# Patient Record
Sex: MALE | Race: WHITE | NOT HISPANIC OR LATINO | Employment: FULL TIME | URBAN - METROPOLITAN AREA
[De-identification: names, ages, dates, MRNs, and addresses within clinical notes are randomized per-mention and may not be internally consistent; named-entity substitution may affect disease eponyms.]

---

## 2023-04-04 ENCOUNTER — OFFICE VISIT (OUTPATIENT)
Dept: PRIMARY CARE | Facility: CLINIC | Age: 63
End: 2023-04-04
Payer: COMMERCIAL

## 2023-04-04 VITALS
HEART RATE: 58 BPM | WEIGHT: 180 LBS | SYSTOLIC BLOOD PRESSURE: 104 MMHG | OXYGEN SATURATION: 98 % | TEMPERATURE: 97.7 F | DIASTOLIC BLOOD PRESSURE: 66 MMHG | BODY MASS INDEX: 28.25 KG/M2 | HEIGHT: 67 IN

## 2023-04-04 DIAGNOSIS — D50.9 IRON DEFICIENCY ANEMIA, UNSPECIFIED IRON DEFICIENCY ANEMIA TYPE: ICD-10-CM

## 2023-04-04 DIAGNOSIS — Z00.00 HEALTH MAINTENANCE EXAMINATION: Primary | ICD-10-CM

## 2023-04-04 PROBLEM — G47.19 EXCESSIVE DAYTIME SLEEPINESS: Status: ACTIVE | Noted: 2023-04-04

## 2023-04-04 PROBLEM — G47.33 OSA (OBSTRUCTIVE SLEEP APNEA): Status: ACTIVE | Noted: 2023-04-04

## 2023-04-04 PROBLEM — M54.2 NECK PAIN: Status: ACTIVE | Noted: 2023-04-04

## 2023-04-04 PROBLEM — R06.83 SNORING: Status: ACTIVE | Noted: 2023-04-04

## 2023-04-04 PROBLEM — D64.9 ANEMIA: Status: ACTIVE | Noted: 2023-04-04

## 2023-04-04 PROBLEM — F07.81 POST-CONCUSSION SYNDROME: Status: ACTIVE | Noted: 2023-04-04

## 2023-04-04 PROBLEM — M21.41 BILATERAL PES PLANUS: Status: ACTIVE | Noted: 2023-04-04

## 2023-04-04 PROBLEM — R53.83 FATIGUE: Status: ACTIVE | Noted: 2023-04-04

## 2023-04-04 PROBLEM — J31.0 RHINITIS: Status: ACTIVE | Noted: 2023-04-04

## 2023-04-04 PROBLEM — M21.42 BILATERAL PES PLANUS: Status: ACTIVE | Noted: 2023-04-04

## 2023-04-04 PROCEDURE — 99396 PREV VISIT EST AGE 40-64: CPT | Performed by: STUDENT IN AN ORGANIZED HEALTH CARE EDUCATION/TRAINING PROGRAM

## 2023-04-04 PROCEDURE — 1036F TOBACCO NON-USER: CPT | Performed by: STUDENT IN AN ORGANIZED HEALTH CARE EDUCATION/TRAINING PROGRAM

## 2023-04-04 RX ORDER — TIZANIDINE 2 MG/1
1-2 TABLET ORAL EVERY 6 HOURS PRN
COMMUNITY
Start: 2021-12-29

## 2023-04-04 ASSESSMENT — PATIENT HEALTH QUESTIONNAIRE - PHQ9
1. LITTLE INTEREST OR PLEASURE IN DOING THINGS: NOT AT ALL
SUM OF ALL RESPONSES TO PHQ9 QUESTIONS 1 AND 2: 0
2. FEELING DOWN, DEPRESSED OR HOPELESS: NOT AT ALL

## 2023-04-04 ASSESSMENT — ENCOUNTER SYMPTOMS
DIARRHEA: 0
FEVER: 0
CONSTIPATION: 0
DYSPHORIC MOOD: 0
NECK STIFFNESS: 1
FATIGUE: 0
WHEEZING: 0
SHORTNESS OF BREATH: 0
DYSURIA: 0
BACK PAIN: 1
NERVOUS/ANXIOUS: 0
HEADACHES: 0
PALPITATIONS: 0
HEMATURIA: 0
CHILLS: 0
COUGH: 0
ABDOMINAL PAIN: 0
NECK PAIN: 1
FREQUENCY: 0
NUMBNESS: 0
NAUSEA: 0
DIZZINESS: 0

## 2023-04-04 NOTE — PROGRESS NOTES
"Abrahan Richardson  presents for his annual wellness exam.    Specialists seen by patient: eye doctor  -dentist    Hx of colon ca screening: yes, thinks he is due next year  Hx of prostate cancer screening (men 55-69): yes  History of depression or anxiety:no  Immunizations: not up to date - tdap needs updated, will do at pharmacy  Diet: Follows a healthy diet, well rounded  Exercise: Gets regular exercise, walks 2 miles with dog daily after work.   Alcohol abuse screen:   none   How many times in the past year 5 or more drinks in a day?  Smoking history: never a smoker  Drug use: n/a    Was anemic last year. Takes iron daily. Had low iron as well.    Review of Systems   Constitutional:  Negative for chills, fatigue and fever.   Respiratory:  Negative for cough, shortness of breath and wheezing.    Cardiovascular:  Negative for chest pain, palpitations and leg swelling.   Gastrointestinal:  Negative for abdominal pain, constipation, diarrhea and nausea.   Genitourinary:  Negative for dysuria, frequency, hematuria and urgency.   Musculoskeletal:  Positive for back pain, neck pain and neck stiffness.   Neurological:  Negative for dizziness, numbness and headaches.   Psychiatric/Behavioral:  Negative for dysphoric mood. The patient is not nervous/anxious.           Objective    /66 (BP Location: Left arm, Patient Position: Sitting, BP Cuff Size: Adult)   Pulse 58   Temp 36.5 °C (97.7 °F) (Temporal)   Ht 1.702 m (5' 7\")   Wt 81.6 kg (180 lb)   SpO2 98%   BMI 28.19 kg/m²     Physical Exam  Constitutional:       General: He is not in acute distress.     Appearance: Normal appearance.   HENT:      Head: Normocephalic and atraumatic.      Right Ear: Tympanic membrane and ear canal normal.      Left Ear: Tympanic membrane and ear canal normal.      Ears:      Comments: Eczema/scaling bilateral external ears     Mouth/Throat:      Mouth: Mucous membranes are moist.      Pharynx: No posterior oropharyngeal erythema. "   Eyes:      Extraocular Movements: Extraocular movements intact.      Pupils: Pupils are equal, round, and reactive to light.   Cardiovascular:      Rate and Rhythm: Normal rate and regular rhythm.      Heart sounds: No murmur heard.  Pulmonary:      Effort: Pulmonary effort is normal. No respiratory distress.      Breath sounds: Normal breath sounds. No wheezing.   Abdominal:      General: Bowel sounds are normal.      Palpations: Abdomen is soft.      Tenderness: There is no abdominal tenderness. There is no guarding.   Musculoskeletal:      Cervical back: Neck supple.      Comments: Normal gait and range of motion   Skin:     General: Skin is warm and dry.   Neurological:      General: No focal deficit present.      Mental Status: He is alert and oriented to person, place, and time.   Psychiatric:         Mood and Affect: Mood normal.         Behavior: Behavior normal.            Problem List Items Addressed This Visit    None  Visit Diagnoses       Health maintenance examination    -  Primary    Relevant Orders    Prostate Specific Antigen    CBC    Lipid Panel    Basic Metabolic Panel    Follow Up In Primary Care             We will obtain fasting blood work.  Results will be communicated to the patient via MyChart or a letter.   We reviewed appropriate preventative health screening guidelines. The patient is not up-to-date on vaccinations.  The patient had a colonoscopy in ~9 years ago.  They advised repeat colonoscopy in 3024.  We discussed regular aerobic exercise. We discussed proper nutrition and weight control.    Tori Ro,   04/04/23

## 2023-04-04 NOTE — PATIENT INSTRUCTIONS
Recommendations for men annual wellness exam:   Colonoscopy at age 45 or earlier if positive family history of colon cancer   Discuss prostate cancer screening between ages 55-69 or sooner if family history of prostate cancer   One time screen for abdominal aortic aneurysm for men ages 65-75 who have ever smoked  Screening for lung cancer with low-dose CT in all adults age 55-77 who have a 30 pack-year smoking history and currently smoke or have quit within the past 15 years  Follow a healthy diet (Dash diet, Mediterranean diet)  Exercise 150 min/wk  Maintain healthy weight (BMI < 25)  Do not smoke   Alcohol in moderation (up to 2 drinks/day)   Get enough sleep (7-8 hours/night)  Make sure immunizations are up to date (influenza, pneumococcal, Tdap, shingles if age > 50)  Visit dentist twice yearly

## 2023-04-05 ENCOUNTER — LAB (OUTPATIENT)
Dept: LAB | Facility: LAB | Age: 63
End: 2023-04-05
Payer: COMMERCIAL

## 2023-04-05 DIAGNOSIS — Z00.00 HEALTH MAINTENANCE EXAMINATION: ICD-10-CM

## 2023-04-05 DIAGNOSIS — D50.9 IRON DEFICIENCY ANEMIA, UNSPECIFIED IRON DEFICIENCY ANEMIA TYPE: ICD-10-CM

## 2023-04-05 LAB
ANION GAP IN SER/PLAS: 8 MMOL/L (ref 10–20)
CALCIUM (MG/DL) IN SER/PLAS: 8.1 MG/DL (ref 8.6–10.3)
CARBON DIOXIDE, TOTAL (MMOL/L) IN SER/PLAS: 28 MMOL/L (ref 21–32)
CHLORIDE (MMOL/L) IN SER/PLAS: 108 MMOL/L (ref 98–107)
CHOLESTEROL (MG/DL) IN SER/PLAS: 145 MG/DL (ref 0–199)
CHOLESTEROL IN HDL (MG/DL) IN SER/PLAS: 41.1 MG/DL
CHOLESTEROL/HDL RATIO: 3.5
CREATININE (MG/DL) IN SER/PLAS: 0.74 MG/DL (ref 0.5–1.3)
ERYTHROCYTE DISTRIBUTION WIDTH (RATIO) BY AUTOMATED COUNT: 14.1 % (ref 11.5–14.5)
ERYTHROCYTE MEAN CORPUSCULAR HEMOGLOBIN CONCENTRATION (G/DL) BY AUTOMATED: 32 G/DL (ref 32–36)
ERYTHROCYTE MEAN CORPUSCULAR VOLUME (FL) BY AUTOMATED COUNT: 90 FL (ref 80–100)
ERYTHROCYTES (10*6/UL) IN BLOOD BY AUTOMATED COUNT: 4.86 X10E12/L (ref 4.5–5.9)
FERRITIN (UG/LL) IN SER/PLAS: 33 UG/L (ref 20–300)
GFR MALE: >90 ML/MIN/1.73M2
GLUCOSE (MG/DL) IN SER/PLAS: 87 MG/DL (ref 74–99)
HEMATOCRIT (%) IN BLOOD BY AUTOMATED COUNT: 43.7 % (ref 41–52)
HEMOGLOBIN (G/DL) IN BLOOD: 14 G/DL (ref 13.5–17.5)
IRON (UG/DL) IN SER/PLAS: 70 UG/DL (ref 35–150)
IRON BINDING CAPACITY (UG/DL) IN SER/PLAS: 343 UG/DL (ref 240–445)
IRON SATURATION (%) IN SER/PLAS: 20 % (ref 25–45)
LDL: 76 MG/DL (ref 0–99)
LEUKOCYTES (10*3/UL) IN BLOOD BY AUTOMATED COUNT: 5.8 X10E9/L (ref 4.4–11.3)
PLATELETS (10*3/UL) IN BLOOD AUTOMATED COUNT: 263 X10E9/L (ref 150–450)
POTASSIUM (MMOL/L) IN SER/PLAS: 5 MMOL/L (ref 3.5–5.3)
PROSTATE SPECIFIC AG (NG/ML) IN SER/PLAS: 0.85 NG/ML (ref 0–4)
SODIUM (MMOL/L) IN SER/PLAS: 139 MMOL/L (ref 136–145)
TRIGLYCERIDE (MG/DL) IN SER/PLAS: 139 MG/DL (ref 0–149)
UREA NITROGEN (MG/DL) IN SER/PLAS: 14 MG/DL (ref 6–23)
VLDL: 28 MG/DL (ref 0–40)

## 2023-04-05 PROCEDURE — 82728 ASSAY OF FERRITIN: CPT

## 2023-04-05 PROCEDURE — 85027 COMPLETE CBC AUTOMATED: CPT

## 2023-04-05 PROCEDURE — 36415 COLL VENOUS BLD VENIPUNCTURE: CPT

## 2023-04-05 PROCEDURE — 80061 LIPID PANEL: CPT

## 2023-04-05 PROCEDURE — 83540 ASSAY OF IRON: CPT

## 2023-04-05 PROCEDURE — 80048 BASIC METABOLIC PNL TOTAL CA: CPT

## 2023-04-05 PROCEDURE — 83550 IRON BINDING TEST: CPT

## 2023-04-05 PROCEDURE — 84153 ASSAY OF PSA TOTAL: CPT

## 2023-04-20 ENCOUNTER — TELEPHONE (OUTPATIENT)
Dept: PRIMARY CARE | Facility: CLINIC | Age: 63
End: 2023-04-20
Payer: COMMERCIAL

## 2024-01-05 ENCOUNTER — TELEPHONE (OUTPATIENT)
Dept: PRIMARY CARE | Facility: CLINIC | Age: 64
End: 2024-01-05
Payer: COMMERCIAL

## 2024-01-08 DIAGNOSIS — Z12.11 SCREENING FOR COLON CANCER: ICD-10-CM

## 2024-01-08 NOTE — TELEPHONE ENCOUNTER
Per Dr. Ro placed order.   Called and spoke to Dafne (wife on HIPAA) and told her the order is in and should be able to schedule. Wanted to schedule on line gave her Colonoscopy scheduling number in case that was unsuccessful.

## 2024-01-10 ENCOUNTER — TELEPHONE (OUTPATIENT)
Dept: GASTROENTEROLOGY | Facility: CLINIC | Age: 64
End: 2024-01-10
Payer: COMMERCIAL

## 2024-01-10 NOTE — TELEPHONE ENCOUNTER
----- Message from Brittaney Snider MA sent at 1/10/2024  9:55 AM EST -----  Regarding: RE: Colonoscopy screening  SCHEDULED FOR 2.19.24 WITH MARY  PATIENT STATED HE ONLY GETS CERTAIN DAYS OFF WORK AND COULD ONLY DO FAISAL DIO DAI DAY OR PRESIDENTS DAY. PREFERRED PRESIDENTS DAY.  ----- Message -----  From: Madeline Jacques RN  Sent: 1/9/2024   2:03 PM EST  To: [facility]  Subject: Colonoscopy screening                            Office visit. Per wife pt is having rectal bleeding.

## 2024-02-19 ENCOUNTER — APPOINTMENT (OUTPATIENT)
Dept: GASTROENTEROLOGY | Facility: CLINIC | Age: 64
End: 2024-02-19
Payer: COMMERCIAL

## 2024-03-06 NOTE — TELEPHONE ENCOUNTER
Patient wants a referral to a dermatologist for eczema   Detail Level: Simple Additional Notes: Patient consent was obtained to proceed with the visit and recommended plan of care after discussion of all risks and benefits, including the risks of COVID-19 exposure.

## 2024-03-27 ENCOUNTER — APPOINTMENT (OUTPATIENT)
Dept: PRIMARY CARE | Facility: CLINIC | Age: 64
End: 2024-03-27
Payer: COMMERCIAL

## 2024-04-01 ENCOUNTER — APPOINTMENT (OUTPATIENT)
Dept: GASTROENTEROLOGY | Facility: CLINIC | Age: 64
End: 2024-04-01
Payer: COMMERCIAL

## 2024-04-01 ENCOUNTER — TELEPHONE (OUTPATIENT)
Dept: PRIMARY CARE | Facility: CLINIC | Age: 64
End: 2024-04-01

## 2024-04-01 NOTE — TELEPHONE ENCOUNTER
Patient's wife called and wanted to know whether to schedule an appointment before or after his colonoscopy he is planning to have done in Sept.

## 2024-04-02 NOTE — TELEPHONE ENCOUNTER
Called and spoke to Dafne. Let them know that it is up to pt when to schedule. If he wants to talk in depth about the colonoscopy then after, but did remind that patient is due for a physical or follow up because it has been a year since seen.   Verbalized understanding.

## 2024-04-04 ENCOUNTER — APPOINTMENT (OUTPATIENT)
Dept: PRIMARY CARE | Facility: CLINIC | Age: 64
End: 2024-04-04
Payer: COMMERCIAL

## 2024-09-09 ENCOUNTER — APPOINTMENT (OUTPATIENT)
Dept: GASTROENTEROLOGY | Facility: CLINIC | Age: 64
End: 2024-09-09
Payer: COMMERCIAL

## 2024-09-09 VITALS
DIASTOLIC BLOOD PRESSURE: 68 MMHG | HEIGHT: 68 IN | SYSTOLIC BLOOD PRESSURE: 106 MMHG | HEART RATE: 68 BPM | OXYGEN SATURATION: 96 % | WEIGHT: 195 LBS | BODY MASS INDEX: 29.55 KG/M2

## 2024-09-09 DIAGNOSIS — R13.19 ESOPHAGEAL DYSPHAGIA: Primary | ICD-10-CM

## 2024-09-09 DIAGNOSIS — Z12.11 COLON CANCER SCREENING: ICD-10-CM

## 2024-09-09 PROCEDURE — 1036F TOBACCO NON-USER: CPT | Performed by: PHYSICIAN ASSISTANT

## 2024-09-09 PROCEDURE — 99204 OFFICE O/P NEW MOD 45 MIN: CPT | Performed by: PHYSICIAN ASSISTANT

## 2024-09-09 PROCEDURE — 3008F BODY MASS INDEX DOCD: CPT | Performed by: PHYSICIAN ASSISTANT

## 2024-09-09 RX ORDER — POLYETHYLENE GLYCOL 3350, SODIUM SULFATE ANHYDROUS, SODIUM BICARBONATE, SODIUM CHLORIDE, POTASSIUM CHLORIDE 236; 22.74; 6.74; 5.86; 2.97 G/4L; G/4L; G/4L; G/4L; G/4L
4 POWDER, FOR SOLUTION ORAL ONCE
Qty: 4000 ML | Refills: 0 | Status: SHIPPED | OUTPATIENT
Start: 2024-09-09 | End: 2024-09-09

## 2024-09-09 NOTE — PATIENT INSTRUCTIONS
Thank you for coming in for your appointment.    -EGD and colonoscopy ordered for screening as well as evaluation of the trouble swallowing.  -Call with questions or concerns  -Bowel prep sent to your pharmacy

## 2024-09-09 NOTE — PROGRESS NOTES
"Subjective   Patient ID: Abrahan Richardson is a 63 y.o. male who was referred by his PCP for Colon Cancer Screening (Oa fail rectal bleed - per pt he has hemorrhoids /Colon 2013).    Patient's PCP is Dr. Ro    PMH: HARMONY    HPI  Patient states that he is here to discuss updating colonoscopy. His last colonoscopy was in 2013 and normal. He states that his only symptom is dysphagia. He states that he has had some chornic dysphagia. He states that when he is swallowing solids, they \"go down slowly\". He states that they get stuck in the center of his upper chest. He states that when the solids get stuck, he has to wash them down with liquids. No dysphagia to liquids. He states that he has chronic GERD, managed with daily OTC acid reflux medication. Denies unexplained weight loss, N/V, abdominal pain, change in bowel habits, melena. He states that he has hemorrhoids and has noticed small amount of red blood with wiping intermittently for many years.     Social History:  NSAIDs: Baby aspirin daily  Tobacco: Denies   Alcohol: Denies  Drug use: Denies     Family History: Denies any known family history of GI malignancy.    Prior GI evaluation:  Colonoscopy 2013: hemorrhoids seen    No EGD in the past.    Review of Systems:  Constitutional: No reported fever, chills, or weight loss.  Skin: No reported icterus, lesions, or rash.  Eye: No reported itching, pain, vision changes.  Ear: No reported discharge, hearing loss, or pain.  Nose: No reported congestion, discharge, or epistaxis.  Mouth/throat: +dysphagia  Resp: No reported cough, dyspnea, or wheezing.  Cardiovascular: No reported chest pain, lower extremity edema, or palpitations.   GI: Reports many years of bright red blood with wiping  : No reported dysuria, hematuria, or frequency.  Neuro: No reported confusion, memory loss, headaches, or dizziness.  Psych: No reported anxiety, depression, or insomnia.  Musculoskeletal: No reported arthralgia, joint swelling, or " myalgias.  Heme/lymph: No reported easy bleeding or bruising, or swollen lymph nodes.  Endocrine: No reported cold/heat intolerance, polydipsia, or polyuria.     Medications:  Prior to Admission medications    Medication Sig Start Date End Date Taking? Authorizing Provider   tiZANidine (Zanaflex) 2 mg tablet Take 1-2 tablets (2-4 mg) by mouth every 6 hours if needed. spasm 12/29/21 9/9/24  Historical Provider, MD       Allergies:  Patient has no known allergies.    Objective   Physical exam:  Constitutional: Well developed, well nourished 63 y.o. year old in no acute distress.   Skin: Warm and dry. Normal turgor. No rash, ulcer, trauma, jaundice.   Eyes: Pupils symmetric and reactive to light.  Respiratory: Clear to auscultation bilaterally. No wheezes, rhonchi, or rales heard.  Cardiovascular: Regular rate and rhythm. S1 and S2 appreciated and normal. No murmur, rub, or gallop heard.   Edema: No edema noted.  GI: Normal bowel sounds. Soft, non-distended, non-tender. No rebound or guarding present. No hepatomegaly or splenomegaly appreciated. Abdominal aorta not palpably abnormal.  Musculoskeletal: Limbs and Joints grossly normal. Full range of motion in major joint.   Neuro: Alert and oriented x 3. Cranial nerves 2-12 grossly intact.   Psych: Normal mood and affect.        Relevant Results Recent labs reviewed in the EMR.    Radiology: Reviewed imaging reviewed in the EMR.  No results found.    Assessment/Plan   Problem List Items Addressed This Visit             ICD-10-CM    Esophageal dysphagia - Primary R13.19     Patient with years of dysphagia in the setting of chronic GERD. He takes OTC acid reflux medications. Due to this and dysphagia, EGD recommended to rule out esophagitis, EoE, esophageal stricture. Mass or malignancy possible but less likely.         Relevant Orders    Esophagogastroduodenoscopy (EGD)    Colon cancer screening Z12.11     Patient's last colonoscopy was in 2013. No significant lower  symptoms at this time. Colonoscopy ordered for further evaluation. Golytely prescribed.         Relevant Medications    polyethylene glycol (Golytely) 236-22.74-6.74 -5.86 gram solution    Other Relevant Orders    Colonoscopy Screening; High Risk Patient       Meds to hold:  Michelle Kinsey PA-C

## 2024-09-09 NOTE — LETTER
"September 9, 2024     Tori Ro,   [address]  [facility]  [facility] [address]    Patient: DB Richardson   YOB: 1960   Date of Visit: 9/9/2024       Dear Dr. Tori Ro, :    Thank you for referring DB Richardson to me for evaluation. Below are my notes for this consultation.  If you have questions, please do not hesitate to call me. I look forward to following your patient along with you.       Sincerely,     Michelle Kinsey PA-C      CC: No Recipients  ______________________________________________________________________________________    Subjective   Patient ID: Db Richardson is a 63 y.o. male who was referred by his PCP for Colon Cancer Screening (Oa fail rectal bleed - per pt he has hemorrhoids /Colon 2013).    Patient's PCP is Dr. Ro    PMH: HARMONY    HPI  Patient states that he is here to discuss updating colonoscopy. His last colonoscopy was in 2013 and normal. He states that his only symptom is dysphagia. He states that he has had some chornic dysphagia. He states that when he is swallowing solids, they \"go down slowly\". He states that they get stuck in the center of his upper chest. He states that when the solids get stuck, he has to wash them down with liquids. No dysphagia to liquids. He states that he has chronic GERD, managed with daily OTC acid reflux medication. Denies unexplained weight loss, N/V, abdominal pain, change in bowel habits, melena. He states that he has hemorrhoids and has noticed small amount of red blood with wiping intermittently for many years.     Social History:  NSAIDs: Baby aspirin daily  Tobacco: Denies   Alcohol: Denies  Drug use: Denies     Family History: Denies any known family history of GI malignancy.    Prior GI evaluation:  Colonoscopy 2013: hemorrhoids seen    No EGD in the past.    Review of Systems:  Constitutional: No reported fever, chills, or weight loss.  Skin: No reported icterus, lesions, or " rash.  Eye: No reported itching, pain, vision changes.  Ear: No reported discharge, hearing loss, or pain.  Nose: No reported congestion, discharge, or epistaxis.  Mouth/throat: +dysphagia  Resp: No reported cough, dyspnea, or wheezing.  Cardiovascular: No reported chest pain, lower extremity edema, or palpitations.   GI: Reports many years of bright red blood with wiping  : No reported dysuria, hematuria, or frequency.  Neuro: No reported confusion, memory loss, headaches, or dizziness.  Psych: No reported anxiety, depression, or insomnia.  Musculoskeletal: No reported arthralgia, joint swelling, or myalgias.  Heme/lymph: No reported easy bleeding or bruising, or swollen lymph nodes.  Endocrine: No reported cold/heat intolerance, polydipsia, or polyuria.     Medications:  Prior to Admission medications    Medication Sig Start Date End Date Taking? Authorizing Provider   tiZANidine (Zanaflex) 2 mg tablet Take 1-2 tablets (2-4 mg) by mouth every 6 hours if needed. spasm 12/29/21 9/9/24  Historical Provider, MD       Allergies:  Patient has no known allergies.    Objective   Physical exam:  Constitutional: Well developed, well nourished 63 y.o. year old in no acute distress.   Skin: Warm and dry. Normal turgor. No rash, ulcer, trauma, jaundice.   Eyes: Pupils symmetric and reactive to light.  Respiratory: Clear to auscultation bilaterally. No wheezes, rhonchi, or rales heard.  Cardiovascular: Regular rate and rhythm. S1 and S2 appreciated and normal. No murmur, rub, or gallop heard.   Edema: No edema noted.  GI: Normal bowel sounds. Soft, non-distended, non-tender. No rebound or guarding present. No hepatomegaly or splenomegaly appreciated. Abdominal aorta not palpably abnormal.  Musculoskeletal: Limbs and Joints grossly normal. Full range of motion in major joint.   Neuro: Alert and oriented x 3. Cranial nerves 2-12 grossly intact.   Psych: Normal mood and affect.        Relevant Results Recent labs reviewed in the  EMR.    Radiology: Reviewed imaging reviewed in the EMR.  No results found.    Assessment/Plan   Problem List Items Addressed This Visit             ICD-10-CM    Esophageal dysphagia - Primary R13.19     Patient with years of dysphagia in the setting of chronic GERD. He takes OTC acid reflux medications. Due to this and dysphagia, EGD recommended to rule out esophagitis, EoE, esophageal stricture. Mass or malignancy possible but less likely.         Relevant Orders    Esophagogastroduodenoscopy (EGD)    Colon cancer screening Z12.11     Patient's last colonoscopy was in 2013. No significant lower symptoms at this time. Colonoscopy ordered for further evaluation. Golytely prescribed.         Relevant Medications    polyethylene glycol (Golytely) 236-22.74-6.74 -5.86 gram solution    Other Relevant Orders    Colonoscopy Screening; High Risk Patient       Meds to hold:  Michelle Kinsey PA-C

## 2024-09-09 NOTE — ASSESSMENT & PLAN NOTE
Patient's last colonoscopy was in 2013. No significant lower symptoms at this time. Colonoscopy ordered for further evaluation. Golytely prescribed.

## 2024-09-09 NOTE — ASSESSMENT & PLAN NOTE
Patient with years of dysphagia in the setting of chronic GERD. He takes OTC acid reflux medications. Due to this and dysphagia, EGD recommended to rule out esophagitis, EoE, esophageal stricture. Mass or malignancy possible but less likely.

## 2024-09-19 ENCOUNTER — APPOINTMENT (OUTPATIENT)
Dept: DERMATOLOGY | Facility: CLINIC | Age: 64
End: 2024-09-19
Payer: COMMERCIAL

## 2024-09-20 ENCOUNTER — HOSPITAL ENCOUNTER (OUTPATIENT)
Dept: RADIOLOGY | Facility: CLINIC | Age: 64
Discharge: HOME | End: 2024-09-20
Payer: COMMERCIAL

## 2024-09-20 ENCOUNTER — OFFICE VISIT (OUTPATIENT)
Dept: ORTHOPEDIC SURGERY | Facility: CLINIC | Age: 64
End: 2024-09-20
Payer: COMMERCIAL

## 2024-09-20 VITALS — BODY MASS INDEX: 29.55 KG/M2 | HEIGHT: 68 IN | WEIGHT: 195 LBS

## 2024-09-20 DIAGNOSIS — M25.512 CHRONIC LEFT SHOULDER PAIN: ICD-10-CM

## 2024-09-20 DIAGNOSIS — G89.29 CHRONIC LEFT SHOULDER PAIN: ICD-10-CM

## 2024-09-20 DIAGNOSIS — M67.912 DISORDER OF LEFT ROTATOR CUFF: Primary | ICD-10-CM

## 2024-09-20 DIAGNOSIS — M19.019 GLENOHUMERAL ARTHRITIS: ICD-10-CM

## 2024-09-20 PROCEDURE — 1036F TOBACCO NON-USER: CPT | Performed by: STUDENT IN AN ORGANIZED HEALTH CARE EDUCATION/TRAINING PROGRAM

## 2024-09-20 PROCEDURE — 73030 X-RAY EXAM OF SHOULDER: CPT | Mod: LT

## 2024-09-20 PROCEDURE — 99204 OFFICE O/P NEW MOD 45 MIN: CPT | Performed by: STUDENT IN AN ORGANIZED HEALTH CARE EDUCATION/TRAINING PROGRAM

## 2024-09-20 PROCEDURE — 3008F BODY MASS INDEX DOCD: CPT | Performed by: STUDENT IN AN ORGANIZED HEALTH CARE EDUCATION/TRAINING PROGRAM

## 2024-09-20 PROCEDURE — 20610 DRAIN/INJ JOINT/BURSA W/O US: CPT | Performed by: STUDENT IN AN ORGANIZED HEALTH CARE EDUCATION/TRAINING PROGRAM

## 2024-09-20 RX ORDER — LIDOCAINE HYDROCHLORIDE 10 MG/ML
5 INJECTION, SOLUTION INFILTRATION; PERINEURAL
Status: COMPLETED | OUTPATIENT
Start: 2024-09-20 | End: 2024-09-20

## 2024-09-20 RX ORDER — TRIAMCINOLONE ACETONIDE 40 MG/ML
80 INJECTION, SUSPENSION INTRA-ARTICULAR; INTRAMUSCULAR
Status: COMPLETED | OUTPATIENT
Start: 2024-09-20 | End: 2024-09-20

## 2024-09-20 ASSESSMENT — PAIN - FUNCTIONAL ASSESSMENT: PAIN_FUNCTIONAL_ASSESSMENT: 0-10

## 2024-09-20 ASSESSMENT — PAIN SCALES - GENERAL: PAINLEVEL_OUTOF10: 8

## 2024-09-20 NOTE — PROGRESS NOTES
CHIEF COMPLAINT:   Chief Complaint   Patient presents with    Left Shoulder - Pain       History: 63 y.o. male presents to the office today for left shoulder pain. He has had localized pain and clicking/popping that is anterior and deep for that past 5 years. He has trouble with overhead activities. His mother had DJD. Reports of numbness/tingling in the hand. He has neck pain and is seeing a doctor for it. No Hx of trauma, Sx, or injections. No diabetes. Advil and an lidocaine cream as needed. XR done today.  He has not had any specific treatments of the left shoulder.  He does work as a .  He is planning on retiring sometime in January.  Pain wakes him up at night and is persistent in the daytime.  No specific injury he can remember.    Past medical history, past surgical history, medications, allergies, family history, social history, and review of systems were reviewed today.    A 12 point review of systems was negative other than as stated in the HPI.    Past Medical History:   Diagnosis Date    Personal history of other specified conditions 07/21/2019    History of fatigue        No Known Allergies     Past Surgical History:   Procedure Laterality Date    OTHER SURGICAL HISTORY  11/15/2013    Abdominal Surgery        Family History   Problem Relation Name Age of Onset    Dementia Mother      Hypertension Mother      Hypertension Father          Social History     Socioeconomic History    Marital status:      Spouse name: Not on file    Number of children: Not on file    Years of education: Not on file    Highest education level: Not on file   Occupational History    Not on file   Tobacco Use    Smoking status: Never    Smokeless tobacco: Never   Vaping Use    Vaping status: Never Used   Substance and Sexual Activity    Alcohol use: Never    Drug use: Never    Sexual activity: Not on file   Other Topics Concern    Not on file   Social History Narrative    Not on file     Social  "Determinants of Health     Financial Resource Strain: Not on file   Food Insecurity: Not on file   Transportation Needs: Not on file   Physical Activity: Not on file   Stress: Not on file   Social Connections: Not on file   Intimate Partner Violence: Not on file   Housing Stability: Not on file        CURRENT MEDICATIONS:   No current outpatient medications on file.     No current facility-administered medications for this visit.       Physical Examination:      11/15/2021     3:39 PM 12/27/2021    10:03 AM 12/29/2021    10:50 AM 4/4/2023     3:02 PM 9/9/2024    11:23 AM   Vitals   Systolic 131 118 126 104 106   Diastolic 86 64 82 66 68   Heart Rate 74 106 74 58 68   Temp 36.2 °C (97.1 °F) 36.3 °C (97.4 °F) 35.9 °C (96.6 °F) 36.5 °C (97.7 °F)    Resp 16       Height (in)  1.702 m (5' 7\")  1.702 m (5' 7\") 1.727 m (5' 8\")   Weight (lb)  189 189 180 195   BMI  29.6 kg/m2 29.6 kg/m2 28.19 kg/m2 29.65 kg/m2   BSA (m2)  2.01 m2 2.01 m2 1.96 m2 2.06 m2   Visit Report    Report Report      There is no height or weight on file to calculate BMI.    Well-appearing, appears stated age, pleasant and cooperative, appropriate mood and behavior. Height and weight reviewed. Alert and oriented x3.  Auditory function intact.  No acute distress.  Intact ocular function, STEPHANIE, EOMI. Breathing is unlabored .  There is no evidence of jugular venous distension. Skin appearance is normal without evidence of rash or other lesions. 2+ radial pulses bilaterally, fingers pink and wwp, good capillary refill, no pitting edema. No appreciable lymphadenopathy in bilateral upper extremities. SILT throughout both upper extremities, median/radial/ulnar/musculocutaneous/axillary nerve motor and sensory intact (except for abnormalities noted in focused musculoskeletal exam section below).     Neck exam: Full range of motion of the neck in flexion/extension and rotational movements. No significant areas of tenderness to palpation in the neck.    On exam " of bilateral upper extremities, he does have symmetric range of motion of both shoulders, but is weak on the left side.  Active forward flexion to 160, external rotation to 40, internal Tatian to T12.  Painful arc of motion on the left.  Rotator cuff strength is mildly diminished on the left, 4+ out of 5 supraspinatus strength and external rotation strength.  5 out of 5 on the right.  Pain with Neer and Emanuel maneuvers of the left shoulder.    Imaging: Radiographs of the left shoulder performed today.  Personally interpreted by myself.  Preserved glenohumeral joint space.  Preserved acromiohumeral interval.  No acute fractures noted.       Assessment: Left rotator cuff disease    Plan: Patient's symptoms, test result and exam are consistent with a diagnosis of rotator cuff disease.  We did discuss the natural history of this.  Conservative management is often the first-line treatment for this, which includes physical therapy, injections and activity modification.  In rare cases of recalcitrant pain, surgery may be indicated, but this is only after extensive nonoperative care.  Patient understands this.  They wish to proceed with injeciton and PT. we will see him back in 3 months to reevaluate.  If is not better we will get an MRI at that point.    Injection was performed, please see separate procedure note, patient tolerated well.     Patient ID: Abrahan Richardson is a 63 y.o. male.    L Inj/Asp: L subacromial bursa on 9/20/2024 11:06 AM  Indications: pain  Details: 22 G needle, posterior approach  Medications: 80 mg triamcinolone acetonide 40 mg/mL; 5 mL lidocaine 10 mg/mL (1 %)  Outcome: tolerated well, no immediate complications  Procedure, treatment alternatives, risks and benefits explained, specific risks discussed. Consent was given by the patient. Immediately prior to procedure a time out was called to verify the correct patient, procedure, equipment, support staff and site/side marked as required. Patient was  prepped and draped in the usual sterile fashion.             Dragon software was used to dictate this note, please be aware that minor errors in transcription may be present.    Jesus Rodriguez MD    Shoulder/Elbow Surgery  [facility]

## 2024-10-11 ENCOUNTER — PREP FOR PROCEDURE (OUTPATIENT)
Dept: GASTROENTEROLOGY | Facility: CLINIC | Age: 64
End: 2024-10-11
Payer: COMMERCIAL

## 2024-10-14 ENCOUNTER — ANESTHESIA (OUTPATIENT)
Dept: GASTROENTEROLOGY | Facility: HOSPITAL | Age: 64
End: 2024-10-14
Payer: COMMERCIAL

## 2024-10-14 ENCOUNTER — ANESTHESIA EVENT (OUTPATIENT)
Dept: GASTROENTEROLOGY | Facility: HOSPITAL | Age: 64
End: 2024-10-14
Payer: COMMERCIAL

## 2024-10-14 ENCOUNTER — HOSPITAL ENCOUNTER (OUTPATIENT)
Dept: GASTROENTEROLOGY | Facility: HOSPITAL | Age: 64
Discharge: HOME | End: 2024-10-14
Payer: COMMERCIAL

## 2024-10-14 VITALS
WEIGHT: 195 LBS | RESPIRATION RATE: 18 BRPM | BODY MASS INDEX: 29.55 KG/M2 | HEART RATE: 58 BPM | DIASTOLIC BLOOD PRESSURE: 96 MMHG | HEIGHT: 68 IN | OXYGEN SATURATION: 97 % | SYSTOLIC BLOOD PRESSURE: 106 MMHG | TEMPERATURE: 98.1 F

## 2024-10-14 DIAGNOSIS — R13.19 ESOPHAGEAL DYSPHAGIA: ICD-10-CM

## 2024-10-14 DIAGNOSIS — Z12.11 COLON CANCER SCREENING: ICD-10-CM

## 2024-10-14 PROBLEM — H54.7 VISION LOSS: Status: ACTIVE | Noted: 2024-10-14

## 2024-10-14 PROCEDURE — 3700000001 HC GENERAL ANESTHESIA TIME - INITIAL BASE CHARGE

## 2024-10-14 PROCEDURE — 7100000010 HC PHASE TWO TIME - EACH INCREMENTAL 1 MINUTE

## 2024-10-14 PROCEDURE — 43239 EGD BIOPSY SINGLE/MULTIPLE: CPT | Performed by: INTERNAL MEDICINE

## 2024-10-14 PROCEDURE — 3700000002 HC GENERAL ANESTHESIA TIME - EACH INCREMENTAL 1 MINUTE

## 2024-10-14 PROCEDURE — 7100000009 HC PHASE TWO TIME - INITIAL BASE CHARGE

## 2024-10-14 PROCEDURE — 2500000004 HC RX 250 GENERAL PHARMACY W/ HCPCS (ALT 636 FOR OP/ED): Performed by: NURSE ANESTHETIST, CERTIFIED REGISTERED

## 2024-10-14 PROCEDURE — 45378 DIAGNOSTIC COLONOSCOPY: CPT | Performed by: INTERNAL MEDICINE

## 2024-10-14 RX ORDER — BISMUTH SUBSALICYLATE 262 MG
1 TABLET,CHEWABLE ORAL DAILY
COMMUNITY

## 2024-10-14 RX ORDER — PROPOFOL 10 MG/ML
INJECTION, EMULSION INTRAVENOUS AS NEEDED
Status: DISCONTINUED | OUTPATIENT
Start: 2024-10-14 | End: 2024-10-14

## 2024-10-14 RX ORDER — LIDOCAINE HYDROCHLORIDE 20 MG/ML
INJECTION, SOLUTION INFILTRATION; PERINEURAL AS NEEDED
Status: DISCONTINUED | OUTPATIENT
Start: 2024-10-14 | End: 2024-10-14

## 2024-10-14 RX ORDER — FENTANYL CITRATE 50 UG/ML
INJECTION, SOLUTION INTRAMUSCULAR; INTRAVENOUS AS NEEDED
Status: DISCONTINUED | OUTPATIENT
Start: 2024-10-14 | End: 2024-10-14

## 2024-10-14 RX ORDER — SODIUM CHLORIDE 0.9 % (FLUSH) 0.9 %
SYRINGE (ML) INJECTION AS NEEDED
Status: DISCONTINUED | OUTPATIENT
Start: 2024-10-14 | End: 2024-10-14

## 2024-10-14 SDOH — HEALTH STABILITY: MENTAL HEALTH: CURRENT SMOKER: 0

## 2024-10-14 ASSESSMENT — PAIN - FUNCTIONAL ASSESSMENT: PAIN_FUNCTIONAL_ASSESSMENT: 0-10

## 2024-10-14 ASSESSMENT — COLUMBIA-SUICIDE SEVERITY RATING SCALE - C-SSRS
2. HAVE YOU ACTUALLY HAD ANY THOUGHTS OF KILLING YOURSELF?: NO
1. IN THE PAST MONTH, HAVE YOU WISHED YOU WERE DEAD OR WISHED YOU COULD GO TO SLEEP AND NOT WAKE UP?: NO
6. HAVE YOU EVER DONE ANYTHING, STARTED TO DO ANYTHING, OR PREPARED TO DO ANYTHING TO END YOUR LIFE?: NO

## 2024-10-14 ASSESSMENT — PAIN SCALES - GENERAL
PAINLEVEL_OUTOF10: 0 - NO PAIN
PAIN_LEVEL: 0
PAINLEVEL_OUTOF10: 0 - NO PAIN

## 2024-10-14 NOTE — ANESTHESIA POSTPROCEDURE EVALUATION
Patient: Abrahan Richardson    Procedure Summary       Date: 10/14/24 Room / Location: [facility]    Anesthesia Start: 0752 Anesthesia Stop: 0831    Procedures:       COLONOSCOPY      EGD Diagnosis:       Colon cancer screening      Esophageal dysphagia    Scheduled Providers: Aldo Brambila MD Responsible Provider: RUDDY Hess    Anesthesia Type: MAC ASA Status: 2            Anesthesia Type: MAC    Vitals Value Taken Time   /96 10/14/24 0853   Temp 36.7 °C (98.1 °F) 10/14/24 0853   Pulse 58 10/14/24 0853   Resp 18 10/14/24 0853   SpO2 97 % 10/14/24 0853       Anesthesia Post Evaluation    Patient location during evaluation: bedside  Patient participation: complete - patient participated  Level of consciousness: awake and alert  Pain score: 0  Pain management: adequate  Airway patency: patent  Cardiovascular status: acceptable and hemodynamically stable  Respiratory status: acceptable  Hydration status: acceptable  Postoperative Nausea and Vomiting: none        There were no known notable events for this encounter.

## 2024-10-14 NOTE — H&P
Pre-sedation Evaluation:  Sedation Necessary For: Analgesia  Sedation to be Managed By: Anesthesia (Monitored Anesthesia Care/MAC)    History of Present Illness and Indication for Procedure      DB Richardson is a 63 y.o. male with a history of HARMONY who presents for EGD/colonoscopy to evaluate GERD/dysphagia and for screening.    He previously had a colonoscopy in 2013 that was normal except for hemorrhoids.    There is no family history of colorectal cancer.        NPO guidelines met: Yes         Review of Systems  Constitutional:  Negative for chills, fever and unexpected weight change.   HENT:  Negative for trouble swallowing.    Respiratory:  Negative for shortness of breath.    Cardiovascular:  Negative for chest pain.   Gastrointestinal:  As above.   Skin:  Negative for color change.       I performed a complete 10 point review of systems and it is negative except as noted in HPI or above. All other systems have been reviewed and are negative.      Patient Active Problem List   Diagnosis    Anemia    Excessive daytime sleepiness    Fatigue    Neck pain    HARMONY (obstructive sleep apnea)    Post-concussion syndrome    Rhinitis    Snoring    Bilateral pes planus    Esophageal dysphagia    Colon cancer screening       Past Medical History:  He has a past medical history of Personal history of other specified conditions (07/21/2019).    Past Surgical History:  He has a past surgical history that includes Other surgical history (11/15/2013).      Social History:  He reports that he has never smoked. He has never used smokeless tobacco. He reports that he does not drink alcohol and does not use drugs.    Family History:  Family History   Problem Relation Name Age of Onset    Dementia Mother      Hypertension Mother      Hypertension Father          Allergies:  Patient has no known allergies.    Current Medications  Current Outpatient Medications on File Prior to Encounter   Medication Sig Dispense Refill     "multivitamin tablet Take 1 tablet by mouth once daily.       No current facility-administered medications on file prior to encounter.         Last Recorded Vitals  Blood pressure 124/76, pulse 71, temperature 36.6 °C (97.8 °F), temperature source Temporal, resp. rate 18, height 1.727 m (5' 8\"), weight 88.5 kg (195 lb), SpO2 98%.      Physical Exam  Vitals reviewed.   Constitutional:       General: He is not in acute distress.     Appearance: He is not ill-appearing.   HENT:      Head: Normocephalic and atraumatic.      Mouth/Throat:      Comments: Mallampati: II  Cardiovascular:      Rate and Rhythm: Normal rate and regular rhythm.      Pulses: Normal pulses.      Heart sounds: Normal heart sounds. No murmur heard.  Pulmonary:      Effort: Pulmonary effort is normal. No respiratory distress.   Abdominal:      General: Bowel sounds are normal.      Palpations: Abdomen is soft.      Tenderness: There is no abdominal tenderness.   Skin:     General: Skin is warm and dry.   Neurological:      General: No focal deficit present.      Mental Status: He is alert and oriented to person, place, and time.              Assessment/Plan       EGD/colonoscopy in endo with MAC sedation, ASA 2      Level of Sedation: Moderate Sedation  (Sedation medications to be delivered via monitored anesthesia care (MAC).     This evaluation serves as my H&P.     Outpatient medication list and allergies have been reviewed.  Pre-procedure/chintan procedure antibiotics not needed.     Pre-procedure evaluation completed by physician.           Aldo Brambila MD          "

## 2024-10-14 NOTE — ANESTHESIA PREPROCEDURE EVALUATION
Patient: Abrahan Richardson    Procedure Information       Date/Time: 10/14/24 0800    Scheduled providers: Aldo Brambila MD    Procedures:       COLONOSCOPY      EGD    Location: [facility]            Relevant Problems   Anesthesia (within normal limits)      Pulmonary   (+) HARMONY (obstructive sleep apnea)      GI   (+) Esophageal dysphagia      Hematology   (+) Anemia      HEENT   (+) Vision loss       Clinical information reviewed:   Tobacco  Allergies  Meds   Med Hx  Surg Hx   Fam Hx  Soc Hx        NPO Detail:  NPO/Void Status  Date of Last Liquid: 10/14/24  Time of Last Liquid: 0645  Date of Last Solid: 10/12/24  Last Intake Type: Clear fluids         Physical Exam    Airway  Mallampati: II  TM distance: >3 FB  Neck ROM: full     Cardiovascular - normal exam  Rhythm: regular  Rate: normal     Dental - normal exam     Pulmonary - normal exam     Abdominal - normal exam             Anesthesia Plan    History of general anesthesia?: yes  History of complications of general anesthesia?: no    ASA 2     MAC     The patient is not a current smoker.    intravenous induction   Anesthetic plan and risks discussed with patient.

## 2024-10-15 NOTE — ADDENDUM NOTE
Encounter addended by: Juliette Gray RN on: 10/15/2024 11:09 AM   Actions taken: Contacts section saved, Flowsheet accepted

## 2024-10-17 NOTE — ADDENDUM NOTE
Encounter addended by: Tanika Apodaca RN on: 10/17/2024 2:23 PM   Actions taken: Check Out activity completed

## 2024-10-21 LAB
LABORATORY COMMENT REPORT: NORMAL
PATH REPORT.FINAL DX SPEC: NORMAL
PATH REPORT.GROSS SPEC: NORMAL
PATH REPORT.TOTAL CANCER: NORMAL

## 2024-12-20 ENCOUNTER — APPOINTMENT (OUTPATIENT)
Dept: ORTHOPEDIC SURGERY | Facility: CLINIC | Age: 64
End: 2024-12-20
Payer: COMMERCIAL

## 2024-12-27 ENCOUNTER — APPOINTMENT (OUTPATIENT)
Dept: ORTHOPEDIC SURGERY | Facility: CLINIC | Age: 64
End: 2024-12-27
Payer: COMMERCIAL

## 2024-12-27 VITALS — HEIGHT: 68 IN | WEIGHT: 195 LBS | BODY MASS INDEX: 29.55 KG/M2

## 2024-12-27 DIAGNOSIS — G89.29 CHRONIC LEFT SHOULDER PAIN: Primary | ICD-10-CM

## 2024-12-27 DIAGNOSIS — M25.512 CHRONIC LEFT SHOULDER PAIN: Primary | ICD-10-CM

## 2024-12-27 DIAGNOSIS — M67.912 DISORDER OF LEFT ROTATOR CUFF: ICD-10-CM

## 2024-12-27 PROCEDURE — 99214 OFFICE O/P EST MOD 30 MIN: CPT | Performed by: STUDENT IN AN ORGANIZED HEALTH CARE EDUCATION/TRAINING PROGRAM

## 2024-12-27 PROCEDURE — 20610 DRAIN/INJ JOINT/BURSA W/O US: CPT | Performed by: STUDENT IN AN ORGANIZED HEALTH CARE EDUCATION/TRAINING PROGRAM

## 2024-12-27 PROCEDURE — 3008F BODY MASS INDEX DOCD: CPT | Performed by: STUDENT IN AN ORGANIZED HEALTH CARE EDUCATION/TRAINING PROGRAM

## 2024-12-27 RX ORDER — TRIAMCINOLONE ACETONIDE 40 MG/ML
80 INJECTION, SUSPENSION INTRA-ARTICULAR; INTRAMUSCULAR
Status: COMPLETED | OUTPATIENT
Start: 2024-12-27 | End: 2024-12-27

## 2024-12-27 RX ORDER — LIDOCAINE HYDROCHLORIDE 10 MG/ML
5 INJECTION, SOLUTION INFILTRATION; PERINEURAL
Status: COMPLETED | OUTPATIENT
Start: 2024-12-27 | End: 2024-12-27

## 2024-12-27 ASSESSMENT — PAIN - FUNCTIONAL ASSESSMENT: PAIN_FUNCTIONAL_ASSESSMENT: 0-10

## 2024-12-27 ASSESSMENT — PAIN SCALES - GENERAL: PAINLEVEL_OUTOF10: 5 - MODERATE PAIN

## 2024-12-27 NOTE — PROGRESS NOTES
CHIEF COMPLAINT:   Chief Complaint   Patient presents with    Left Shoulder - Follow-up       History: 64 y.o. male is returning to the office today to follow up for their left shoulder. He was last given a L subacromial bursa CSI on 9/20/2024. Reports injection has given relief and lasted about 6 weeks.  The pain has now returned.  Reports willing to do another injection is needed.  He is planning to retire in February.  Has not been unable to do physical therapy yet.      9/20/2024 DB Richardson presents to the office today for left shoulder pain. He has had localized pain and clicking/popping that is anterior and deep for that past 5 years. He has trouble with overhead activities. His mother had DJD. Reports of numbness/tingling in the hand. He has neck pain and is seeing a doctor for it. No Hx of trauma, Sx, or injections. No diabetes. Advil and an lidocaine cream as needed. XR done today.  He has not had any specific treatments of the left shoulder.  He does work as a .  He is planning on retiring sometime in January.  Pain wakes him up at night and is persistent in the daytime.  No specific injury he can remember.    Past medical history, past surgical history, medications, allergies, family history, social history, and review of systems were reviewed today.    A 12 point review of systems was negative other than as stated in the HPI.    Past Medical History:   Diagnosis Date    Personal history of other specified conditions 07/21/2019    History of fatigue        No Known Allergies     Past Surgical History:   Procedure Laterality Date    OTHER SURGICAL HISTORY  11/15/2013    Abdominal Surgery        Family History   Problem Relation Name Age of Onset    Dementia Mother      Hypertension Mother      Hypertension Father          Social History     Socioeconomic History    Marital status:      Spouse name: Not on file    Number of children: Not on file    Years of education: Not on file     "Highest education level: Not on file   Occupational History    Not on file   Tobacco Use    Smoking status: Never    Smokeless tobacco: Never   Vaping Use    Vaping status: Never Used   Substance and Sexual Activity    Alcohol use: Never    Drug use: Never    Sexual activity: Not on file   Other Topics Concern    Not on file   Social History Narrative    Not on file     Social Drivers of Health     Financial Resource Strain: Not on file   Food Insecurity: Not on file   Transportation Needs: Not on file   Physical Activity: Not on file   Stress: Not on file   Social Connections: Not on file   Intimate Partner Violence: Not on file   Housing Stability: Not on file        CURRENT MEDICATIONS:   Current Outpatient Medications   Medication Sig Dispense Refill    multivitamin tablet Take 1 tablet by mouth once daily.       No current facility-administered medications for this visit.       Physical Examination:      4/4/2023     3:02 PM 9/9/2024    11:23 AM 9/20/2024    10:44 AM 10/14/2024     7:15 AM 10/14/2024     8:30 AM 10/14/2024     8:41 AM 10/14/2024     8:53 AM   Vitals   Systolic 104 106  124 104 116 106   Diastolic 66 68  76 72 92 96   BP Location Left arm         Heart Rate 58 68  71 60 58 58   Temp 36.5 °C (97.7 °F)   36.6 °C (97.8 °F) 37 °C (98.6 °F)  36.7 °C (98.1 °F)   Resp    18 16 16 18   Height 1.702 m (5' 7\") 1.727 m (5' 8\") 1.727 m (5' 8\") 1.727 m (5' 8\")      Weight (lb) 180 195 195 195      BMI 28.19 kg/m2 29.65 kg/m2 29.65 kg/m2 29.65 kg/m2      BSA (m2) 1.96 m2 2.06 m2 2.06 m2 2.06 m2      Visit Report Report Report Report          There is no height or weight on file to calculate BMI.    Well-appearing, appears stated age, pleasant and cooperative, appropriate mood and behavior. Height and weight reviewed. Alert and oriented x3.  Auditory function intact.  No acute distress.  Intact ocular function, STEPHANIE, EOMI. Breathing is unlabored .  There is no evidence of jugular venous distension. Skin " appearance is normal without evidence of rash or other lesions. 2+ radial pulses bilaterally, fingers pink and wwp, good capillary refill, no pitting edema. No appreciable lymphadenopathy in bilateral upper extremities. SILT throughout both upper extremities, median/radial/ulnar/musculocutaneous/axillary nerve motor and sensory intact (except for abnormalities noted in focused musculoskeletal exam section below).     Neck exam: Full range of motion of the neck in flexion/extension and rotational movements. No significant areas of tenderness to palpation in the neck.    On exam of bilateral upper extremities, he does have symmetric range of motion of both shoulders, but is weak on the left side.  Active forward flexion to 160, external rotation to 40, internal Tatian to T12.  Painful arc of motion on the left.  Rotator cuff strength is mildly diminished on the left, 4+ out of 5 supraspinatus strength and external rotation strength.  5 out of 5 on the right.  Pain with Neer and Emanuel maneuvers of the left shoulder.    Imaging: Radiographs of the left shoulder reviewed today.  Personally interpreted by myself.  Preserved glenohumeral joint space.  Preserved acromiohumeral interval.  No acute fractures noted.       Assessment: Left rotator cuff disease    Plan: Patient's symptoms, test result and exam are consistent with a diagnosis of rotator cuff disease.  We did discuss the natural history of this.  Conservative management is often the first-line treatment for this, which includes physical therapy, injections and activity modification.  In rare cases of recalcitrant pain, surgery may be indicated, but this is only after extensive nonoperative care.  Patient understands this.      We gave the patient injection about 3 months ago and he had good relief from this.  Portion out is worn off.  He has not been able to go to physical therapy yet.  We will reinitiate therapy and repeat injection today.    Injection was  performed, please see separate procedure note, patient tolerated well.     Patient ID: Abrahan Richardson is a 64 y.o. male.    L Inj/Asp: L subacromial bursa on 12/27/2024 12:19 PM  Indications: pain  Details: 22 G needle, posterior approach  Medications: 80 mg triamcinolone acetonide 40 mg/mL; 5 mL lidocaine 10 mg/mL (1 %)  Outcome: tolerated well, no immediate complications  Procedure, treatment alternatives, risks and benefits explained, specific risks discussed. Consent was given by the patient. Immediately prior to procedure a time out was called to verify the correct patient, procedure, equipment, support staff and site/side marked as required. Patient was prepped and draped in the usual sterile fashion.               Dragon software was used to dictate this note, please be aware that minor errors in transcription may be present.    Jesus Rodriguez MD    Shoulder/Elbow Surgery  [facility] [place]

## 2024-12-30 ENCOUNTER — APPOINTMENT (OUTPATIENT)
Dept: PRIMARY CARE | Facility: CLINIC | Age: 64
End: 2024-12-30
Payer: COMMERCIAL

## 2025-01-22 ENCOUNTER — TELEPHONE (OUTPATIENT)
Dept: PRIMARY CARE | Facility: CLINIC | Age: 65
End: 2025-01-22
Payer: COMMERCIAL

## 2025-01-22 DIAGNOSIS — Z00.00 HEALTH MAINTENANCE EXAMINATION: Primary | ICD-10-CM

## 2025-01-22 ASSESSMENT — PROMIS GLOBAL HEALTH SCALE
RATE_AVERAGE_PAIN: 3
RATE_AVERAGE_FATIGUE: MILD
RATE_GENERAL_HEALTH: EXCELLENT
CARRYOUT_SOCIAL_ACTIVITIES: EXCELLENT
RATE_PHYSICAL_HEALTH: EXCELLENT
RATE_MENTAL_HEALTH: VERY GOOD
RATE_SOCIAL_SATISFACTION: EXCELLENT
CARRYOUT_PHYSICAL_ACTIVITIES: MOSTLY
RATE_QUALITY_OF_LIFE: EXCELLENT
EMOTIONAL_PROBLEMS: RARELY

## 2025-01-22 NOTE — TELEPHONE ENCOUNTER
Pt called to state that he has an apt on Friday 01-24/25 for a physical and would like ot have his labs done prior to  the apt    Please review and advise

## 2025-01-23 ASSESSMENT — ENCOUNTER SYMPTOMS
SHORTNESS OF BREATH: 0
ABDOMINAL PAIN: 0
CHILLS: 0
FATIGUE: 0
FREQUENCY: 0
CONSTIPATION: 0
COUGH: 0
NECK PAIN: 1
HEMATURIA: 0
NUMBNESS: 0
WHEEZING: 0
NERVOUS/ANXIOUS: 0
HEADACHES: 0
DIARRHEA: 0
PALPITATIONS: 0
FEVER: 0
NAUSEA: 0
DIZZINESS: 0
DYSPHORIC MOOD: 0
BACK PAIN: 1
NECK STIFFNESS: 1
DYSURIA: 0

## 2025-01-23 NOTE — PROGRESS NOTES
"Abrahan Richardson  presents for his annual wellness exam.    Specialists seen by patient: eye doctor  -dentist  -derm    Hx of colon ca screening: Colonoscopy 2024.  Repeat in 2034  History of depression or anxiety:no  Immunizations: He had a flu shot in November at work  Diet: Follows a healthy diet, well rounded  Exercise: Walking on treadmill for 3-4 miles per day, when it isn't snowing he will walk his dog for 2 miles a day  Alcohol abuse screen:   rare  How many times in the past year 5 or more drinks in a day? 0  Smoking history: never a smoker  Drug use: n/a  PHQ-9: 4  HCPOA: in the process    Retiring in 10 days. He is getting more tired than he used to. Has been working almost daily for 5-6 years.     Review of Systems   Constitutional:  Negative for chills, fatigue and fever.   Respiratory:  Negative for cough, shortness of breath and wheezing.    Cardiovascular:  Negative for chest pain, palpitations and leg swelling.   Gastrointestinal:  Negative for abdominal pain, constipation, diarrhea and nausea.   Genitourinary:  Negative for dysuria, frequency, hematuria and urgency.   Musculoskeletal:  Positive for back pain, neck pain and neck stiffness.   Neurological:  Negative for dizziness, numbness and headaches.   Psychiatric/Behavioral:  Negative for dysphoric mood. The patient is not nervous/anxious.           Objective    /80 (BP Location: Right arm, Patient Position: Sitting, BP Cuff Size: Adult)   Pulse 61   Temp 36.2 °C (97.2 °F) (Temporal)   Ht 1.72 m (5' 7.72\")   Wt 90.4 kg (199 lb 6.4 oz)   SpO2 98%   BMI 30.57 kg/m²     Physical Exam  Constitutional:       General: He is not in acute distress.     Appearance: Normal appearance.   HENT:      Head: Normocephalic and atraumatic.      Right Ear: Tympanic membrane and ear canal normal.      Left Ear: Tympanic membrane and ear canal normal.      Ears:      Comments: Eczema/scaling bilateral external ears     Mouth/Throat:      Mouth: Mucous " membranes are moist.      Pharynx: No posterior oropharyngeal erythema.   Eyes:      Extraocular Movements: Extraocular movements intact.      Pupils: Pupils are equal, round, and reactive to light.   Cardiovascular:      Rate and Rhythm: Normal rate and regular rhythm.      Heart sounds: No murmur heard.  Pulmonary:      Effort: Pulmonary effort is normal. No respiratory distress.      Breath sounds: Normal breath sounds. No wheezing.   Abdominal:      General: Bowel sounds are normal.      Palpations: Abdomen is soft.      Tenderness: There is no abdominal tenderness. There is no guarding.   Musculoskeletal:      Cervical back: Neck supple.      Comments: Normal gait and range of motion   Skin:     General: Skin is warm and dry.   Neurological:      General: No focal deficit present.      Mental Status: He is alert and oriented to person, place, and time.   Psychiatric:         Mood and Affect: Mood normal.         Behavior: Behavior normal.            Problem List Items Addressed This Visit       Fatigue    Relevant Orders    Testosterone, total and free     Other Visit Diagnoses       Health maintenance examination    -  Primary    Depression screening              We will obtain fasting blood work.  Results will be communicated to the patient via MyChart, phone call, or a letter.   We reviewed appropriate preventative health screening guidelines.  We discussed regular aerobic exercise. We discussed proper nutrition and weight control.      5-10 minutes were spent screening for depression using PHQ-2/PHQ-9 as documented in the chart      Tori Ro DO  01/24/25

## 2025-01-24 ENCOUNTER — LAB (OUTPATIENT)
Dept: LAB | Facility: LAB | Age: 65
End: 2025-01-24
Payer: COMMERCIAL

## 2025-01-24 ENCOUNTER — APPOINTMENT (OUTPATIENT)
Dept: PRIMARY CARE | Facility: CLINIC | Age: 65
End: 2025-01-24
Payer: COMMERCIAL

## 2025-01-24 VITALS
BODY MASS INDEX: 30.22 KG/M2 | DIASTOLIC BLOOD PRESSURE: 80 MMHG | HEART RATE: 61 BPM | HEIGHT: 68 IN | OXYGEN SATURATION: 98 % | SYSTOLIC BLOOD PRESSURE: 120 MMHG | TEMPERATURE: 97.2 F | WEIGHT: 199.4 LBS

## 2025-01-24 DIAGNOSIS — Z00.00 HEALTH MAINTENANCE EXAMINATION: Primary | ICD-10-CM

## 2025-01-24 DIAGNOSIS — R53.83 FATIGUE, UNSPECIFIED TYPE: ICD-10-CM

## 2025-01-24 DIAGNOSIS — Z00.00 HEALTH MAINTENANCE EXAMINATION: ICD-10-CM

## 2025-01-24 DIAGNOSIS — Z13.31 DEPRESSION SCREENING: ICD-10-CM

## 2025-01-24 LAB
ALBUMIN SERPL BCP-MCNC: 3.8 G/DL (ref 3.4–5)
ALP SERPL-CCNC: 45 U/L (ref 33–136)
ALT SERPL W P-5'-P-CCNC: 14 U/L (ref 10–52)
ANION GAP SERPL CALC-SCNC: 9 MMOL/L (ref 10–20)
AST SERPL W P-5'-P-CCNC: 11 U/L (ref 9–39)
BILIRUB SERPL-MCNC: 0.5 MG/DL (ref 0–1.2)
BUN SERPL-MCNC: 18 MG/DL (ref 6–23)
CALCIUM SERPL-MCNC: 8.8 MG/DL (ref 8.6–10.3)
CHLORIDE SERPL-SCNC: 105 MMOL/L (ref 98–107)
CHOLEST SERPL-MCNC: 177 MG/DL (ref 0–199)
CHOLESTEROL/HDL RATIO: 3.3
CO2 SERPL-SCNC: 28 MMOL/L (ref 21–32)
CREAT SERPL-MCNC: 0.79 MG/DL (ref 0.5–1.3)
EGFRCR SERPLBLD CKD-EPI 2021: >90 ML/MIN/1.73M*2
ERYTHROCYTE [DISTWIDTH] IN BLOOD BY AUTOMATED COUNT: 14.6 % (ref 11.5–14.5)
GLUCOSE SERPL-MCNC: 83 MG/DL (ref 74–99)
HCT VFR BLD AUTO: 44.8 % (ref 41–52)
HDLC SERPL-MCNC: 52.9 MG/DL
HGB BLD-MCNC: 14.1 G/DL (ref 13.5–17.5)
LDLC SERPL CALC-MCNC: 96 MG/DL
MCH RBC QN AUTO: 29.8 PG (ref 26–34)
MCHC RBC AUTO-ENTMCNC: 31.5 G/DL (ref 32–36)
MCV RBC AUTO: 95 FL (ref 80–100)
NON HDL CHOLESTEROL: 124 MG/DL (ref 0–149)
NRBC BLD-RTO: 0 /100 WBCS (ref 0–0)
PLATELET # BLD AUTO: 272 X10*3/UL (ref 150–450)
POTASSIUM SERPL-SCNC: 4.7 MMOL/L (ref 3.5–5.3)
PROT SERPL-MCNC: 5.9 G/DL (ref 6.4–8.2)
PSA SERPL-MCNC: 1.09 NG/ML
RBC # BLD AUTO: 4.73 X10*6/UL (ref 4.5–5.9)
SODIUM SERPL-SCNC: 137 MMOL/L (ref 136–145)
TRIGL SERPL-MCNC: 143 MG/DL (ref 0–149)
VLDL: 29 MG/DL (ref 0–40)
WBC # BLD AUTO: 5.4 X10*3/UL (ref 4.4–11.3)

## 2025-01-24 PROCEDURE — 3008F BODY MASS INDEX DOCD: CPT | Performed by: STUDENT IN AN ORGANIZED HEALTH CARE EDUCATION/TRAINING PROGRAM

## 2025-01-24 PROCEDURE — 80061 LIPID PANEL: CPT

## 2025-01-24 PROCEDURE — 84153 ASSAY OF PSA TOTAL: CPT

## 2025-01-24 PROCEDURE — 99396 PREV VISIT EST AGE 40-64: CPT | Performed by: STUDENT IN AN ORGANIZED HEALTH CARE EDUCATION/TRAINING PROGRAM

## 2025-01-24 PROCEDURE — 84402 ASSAY OF FREE TESTOSTERONE: CPT

## 2025-01-24 PROCEDURE — 1036F TOBACCO NON-USER: CPT | Performed by: STUDENT IN AN ORGANIZED HEALTH CARE EDUCATION/TRAINING PROGRAM

## 2025-01-24 PROCEDURE — 80053 COMPREHEN METABOLIC PANEL: CPT

## 2025-01-24 PROCEDURE — 36415 COLL VENOUS BLD VENIPUNCTURE: CPT

## 2025-01-24 PROCEDURE — 85027 COMPLETE CBC AUTOMATED: CPT

## 2025-01-24 PROCEDURE — 96127 BRIEF EMOTIONAL/BEHAV ASSMT: CPT | Performed by: STUDENT IN AN ORGANIZED HEALTH CARE EDUCATION/TRAINING PROGRAM

## 2025-01-24 RX ORDER — DOCOSAHEXAENOIC ACID/EPA 120-180 MG
1 CAPSULE ORAL DAILY
COMMUNITY

## 2025-01-24 RX ORDER — MAGNESIUM 250 MG
1 TABLET ORAL DAILY
COMMUNITY

## 2025-01-24 ASSESSMENT — ENCOUNTER SYMPTOMS
OCCASIONAL FEELINGS OF UNSTEADINESS: 0
DEPRESSION: 0

## 2025-01-24 ASSESSMENT — PATIENT HEALTH QUESTIONNAIRE - PHQ9
1. LITTLE INTEREST OR PLEASURE IN DOING THINGS: NOT AT ALL
2. FEELING DOWN, DEPRESSED OR HOPELESS: SEVERAL DAYS
4. FEELING TIRED OR HAVING LITTLE ENERGY: SEVERAL DAYS
5. POOR APPETITE OR OVEREATING: SEVERAL DAYS
6. FEELING BAD ABOUT YOURSELF - OR THAT YOU ARE A FAILURE OR HAVE LET YOURSELF OR YOUR FAMILY DOWN: SEVERAL DAYS
10. IF YOU CHECKED OFF ANY PROBLEMS, HOW DIFFICULT HAVE THESE PROBLEMS MADE IT FOR YOU TO DO YOUR WORK, TAKE CARE OF THINGS AT HOME, OR GET ALONG WITH OTHER PEOPLE: SOMEWHAT DIFFICULT
SUM OF ALL RESPONSES TO PHQ9 QUESTIONS 1 AND 2: 1

## 2025-01-30 LAB
TESTOSTERONE FREE (CHAN): 70.6 PG/ML (ref 35–155)
TESTOSTERONE,TOTAL,LC-MS/MS: 392 NG/DL (ref 250–1100)

## 2025-02-24 ENCOUNTER — APPOINTMENT (OUTPATIENT)
Dept: PRIMARY CARE | Facility: CLINIC | Age: 65
End: 2025-02-24
Payer: COMMERCIAL

## 2025-03-15 ENCOUNTER — APPOINTMENT (OUTPATIENT)
Dept: URGENT CARE | Age: 65
End: 2025-03-15
Payer: COMMERCIAL

## 2025-03-16 ENCOUNTER — ANCILLARY PROCEDURE (OUTPATIENT)
Dept: URGENT CARE | Age: 65
End: 2025-03-16
Payer: COMMERCIAL

## 2025-03-16 ENCOUNTER — OFFICE VISIT (OUTPATIENT)
Dept: URGENT CARE | Age: 65
End: 2025-03-16
Payer: COMMERCIAL

## 2025-03-16 VITALS
DIASTOLIC BLOOD PRESSURE: 77 MMHG | HEART RATE: 67 BPM | TEMPERATURE: 97.6 F | RESPIRATION RATE: 17 BRPM | OXYGEN SATURATION: 98 % | SYSTOLIC BLOOD PRESSURE: 114 MMHG

## 2025-03-16 DIAGNOSIS — M79.671 PAIN OF RIGHT HEEL: Primary | ICD-10-CM

## 2025-03-16 DIAGNOSIS — M79.671 PAIN OF RIGHT HEEL: ICD-10-CM

## 2025-03-16 PROCEDURE — 99203 OFFICE O/P NEW LOW 30 MIN: CPT

## 2025-03-16 PROCEDURE — 1036F TOBACCO NON-USER: CPT

## 2025-03-16 PROCEDURE — 73650 X-RAY EXAM OF HEEL: CPT | Mod: RIGHT SIDE

## 2025-03-16 ASSESSMENT — PAIN SCALES - GENERAL: PAINLEVEL_OUTOF10: 7

## 2025-03-16 ASSESSMENT — ENCOUNTER SYMPTOMS
CONSTITUTIONAL NEGATIVE: 1
MUSCULOSKELETAL NEGATIVE: 1

## 2025-03-16 NOTE — PROGRESS NOTES
"Subjective   Patient ID: Abrahan Richardson \"Yoni\" is a 64 y.o. male. They present today with a chief complaint of Heel Pain (Right heel pain for two weeks. No known injury. ).    History of Present Illness  64-year-old male presents to the clinic today with complaints of right heel pain.  Patient has been ongoing for 2 weeks.  No noted injury.  Denies radiation of the pain.  He has tried some icing.          Past Medical History  Allergies as of 03/16/2025    (No Known Allergies)       (Not in a hospital admission)       Past Medical History:   Diagnosis Date    Personal history of other specified conditions 07/21/2019    History of fatigue       Past Surgical History:   Procedure Laterality Date    OTHER SURGICAL HISTORY  11/15/2013    Abdominal Surgery        reports that he has never smoked. He has never used smokeless tobacco. He reports that he does not drink alcohol and does not use drugs.    Review of Systems  Review of Systems   Constitutional: Negative.    Musculoskeletal: Negative.                                   Objective    Vitals:    03/16/25 1721   BP: 114/77   Pulse: 67   Resp: 17   Temp: 36.4 °C (97.6 °F)   TempSrc: Oral   SpO2: 98%     No LMP for male patient.    Physical Exam  Constitutional:       Appearance: Normal appearance.   Musculoskeletal:      Comments: Minor discomfort to the heel to palpation   Neurological:      Mental Status: He is alert.         Procedures    Point of Care Test & Imaging Results from this visit  No results found for this visit on 03/16/25.   XR calcaneus right 2 views    Result Date: 3/16/2025  Interpreted By:  Leif Pimentel, STUDY: XR CALCANEUS RIGHT 2 VIEWS; ;  3/16/2025 5:55 pm   INDICATION: Signs/Symptoms:pain at heel for 2 weeks.   COMPARISON: None.   ACCESSION NUMBER(S): [id number]   ORDERING CLINICIAN: VIJAY ANDERSON   FINDINGS: Single-view lateral radiographs of the ankle/calcaneus was obtained. No additional images could be obtained due to equipment " malfunction.   No fractures or destructive lesions are identified. The joint spaces and articular surfaces are maintained. The alignment is anatomic. The soft tissues are unremarkable.       Normal limited lateral view of the right ankle/calcaneus.   Signed by: Leif Pimentel 3/16/2025 6:53 PM Dictation workstation:   [id number]     Diagnostic study results (if any) were reviewed by Trev Braxton PA-C.    Assessment/Plan   Allergies, medications, history, and pertinent labs/EKGs/Imaging reviewed by Trev Braxton PA-C.     Medical Decision Making  Patient pleasant cooperative on examination.    On examination there is mild tenderness to the heel itself.  There is no tenderness over the plantar fascia.  No aggravation with dorsiflexion.    X-rays were obtained and reveal any acute bony pathology.    Discussed with patient that this may be inflammation versus plantar fascia versus other.    Advised patient on RICE protocol.    I did provide him with heel cups in which he states he does feel better.    If symptoms persist please follow-up with orthopedics or primary care.    Patient agreement with plan.    Orders and Diagnoses  Diagnoses and all orders for this visit:  Pain of right heel  -     XR calcaneus right 2 views; Future      Medical Admin Record      Patient disposition: Home    Electronically signed by Trev Braxton PA-C  7:14 PM

## 2025-06-27 ENCOUNTER — APPOINTMENT (OUTPATIENT)
Dept: PODIATRY | Facility: CLINIC | Age: 65
End: 2025-06-27
Payer: COMMERCIAL

## 2025-06-27 DIAGNOSIS — M72.2 PLANTAR FASCIITIS: Primary | ICD-10-CM

## 2025-06-27 DIAGNOSIS — M79.671 RIGHT FOOT PAIN: ICD-10-CM

## 2025-06-27 DIAGNOSIS — M21.42 PES PLANUS OF BOTH FEET: ICD-10-CM

## 2025-06-27 DIAGNOSIS — M21.41 PES PLANUS OF BOTH FEET: ICD-10-CM

## 2025-06-27 RX ORDER — TRIAMCINOLONE ACETONIDE 40 MG/ML
20 INJECTION, SUSPENSION INTRA-ARTICULAR; INTRAMUSCULAR
Status: COMPLETED | OUTPATIENT
Start: 2025-06-27 | End: 2025-06-27

## 2025-06-27 RX ADMIN — TRIAMCINOLONE ACETONIDE 20 MG: 40 INJECTION, SUSPENSION INTRA-ARTICULAR; INTRAMUSCULAR at 14:35

## 2025-06-27 ASSESSMENT — PATIENT HEALTH QUESTIONNAIRE - PHQ9
SUM OF ALL RESPONSES TO PHQ9 QUESTIONS 1 AND 2: 0
2. FEELING DOWN, DEPRESSED OR HOPELESS: NOT AT ALL
1. LITTLE INTEREST OR PLEASURE IN DOING THINGS: NOT AT ALL

## 2025-06-27 NOTE — PROGRESS NOTES
CC: right heel pain.     HPI:  Patient presents complaining right  heel pain  x 4 months Pain is moderate and rated as 8/10. Pain is present with initial step and after sitting, denies trauma.   Denies any swelling or redness. Had xrays done.    PCP: Dr. Ro  Last visit: 1-24-25     PMH  Medical History[1]  MEDS  Current Medications[2]  Allergies  Allergies[3]  Social History[4]  Family History[5]  Surgical History[6]    REVIEW OF SYSTEMS    Musculoskeletal: + as noted in HPI.       Physical examination:   On General Observation: Patient is a pleasant, cooperative, well developed 64 y.o.  adult male. The patient is alert and oriented to time, place and person. Patient has normal affect and mood.  There were no vitals taken for this visit.    Vascular:  DP and PT pulses are 2/4 b/l.  Mild edema to noted to right heel.  CFT 5 sec 1-5.    Muscular: Strength is 5/5 for all instrinsic and extrinsic muscle groups with exception of ankle dorsiflexion and plantarflexion d/t guarding.  Pain with palpation to the plantar medial aspect of the right heel at the insertion of the plantar medial calcaneal tubercle.  No signs of calcaneal stress fracture.      Neuro:   Light touch present bilateral.     Derm:   Skin texture and turgor within normal limits.     No rashes, subcutaneous nodules, or open lesions noted.  No hyperkeratotic tissue. No erythema    Ortho:  ROM is stable 1st mpj, mtj, stj, aj, pain is present medial band of the plantar fascia, loss of the medial arch present, heel inversion present with toe raise.    Xrays:  STUDY:  XR CALCANEUS RIGHT 2 VIEWS; ;  3/16/2025 5:55 pm      INDICATION:  Signs/Symptoms:pain at heel for 2 weeks.      COMPARISON:  None.      ACCESSION NUMBER(S):  [id number]      ORDERING CLINICIAN:  VIJAY ANDERSON      FINDINGS:  Single-view lateral radiographs of the ankle/calcaneus was obtained.  No additional images could be obtained due to equipment malfunction.      No fractures or  "destructive lesions are identified. The joint spaces  and articular surfaces are maintained. The alignment is anatomic. The  soft tissues are unremarkable.      IMPRESSION:  Normal limited lateral view of the right ankle/calcaneus.      ASSESSMENT:    Plantar fasciitis right   Pes planus  Pain right foot       PLAN:   Consult  A comprehensive history and physical examination were preformed. The patient was educated on clinical findings, diagnosis and treatment plans. Patient understands all that has been explained and all questions were answered to apparent satisfaction.     - We discussed the etiology of the heel complaints as well as the plantar fasciitis treatment protocol.  - Injection was performed using 3cc  total of a mix of 1cc  of 2% lidocaine plain, 1cc of 0.5% marcaine plain, 1/2cc of dexamethasone, and 1/2cc kenalog to the right  plantar fascia. Patient tolerated injection well. Site covered with bandaid.    - Pt to to begin stretching, icing,  and wearing walking boot/appropriate shoes .   -Advised patient on use of anti-inflammatory medications .   - Patient given instructions to start PT.    -  Consider custom inserts at next visit pending insurance coverage.   -Education given to pt.  - Follow up in 2 weeks  Patient ID: Abrahan Richardson \"Yoni\" is a 64 y.o. male.    S Inj/Asp (Right foot) on 6/27/2025 2:35 PM  Indications: pain  Details: 25 G needle (Plantar medial) approach  Medications: 2 mg dexAMETHasone 4 mg/mL; 20 mg triamcinolone acetonide 40 mg/mL    50/50 2% lidocaine plain and 0.5% marcaine plain 2cc  Procedure, treatment alternatives, risks and benefits explained, specific risks discussed. Consent was given by the patient. Immediately prior to procedure a time out was called to verify the correct patient, procedure, equipment, support staff and site/side marked as required. Patient was prepped and draped in the usual sterile fashion.           Joaquin Sutton DPM          [1]   Past Medical " History:  Diagnosis Date    Bunion 1/1/23    Lyssa wynn 01-    GERD (gastroesophageal reflux disease) 1/2015    Gout     Neck strain     Personal history of other specified conditions 07/21/2019    History of fatigue    Plantar fasciitis 4-1-25    Rotator cuff syndrome     Varicella 1/1967   [2]   Current Outpatient Medications:     fish oil concentrate (Fish OiL) 120-180 mg capsule, Take 1 capsule (1 g) by mouth once daily., Disp: , Rfl:     magnesium 250 mg tablet, Take 1 tablet (250 mg) by mouth once daily., Disp: , Rfl:     multivitamin tablet, Take 1 tablet by mouth once daily., Disp: , Rfl:     zinc sulfate (ZINC-220 ORAL), Take 1 tablet by mouth once daily., Disp: , Rfl:   [3] No Known Allergies  [4]   Social History  Socioeconomic History    Marital status:    Tobacco Use    Smoking status: Never    Smokeless tobacco: Never   Vaping Use    Vaping status: Never Used   Substance and Sexual Activity    Alcohol use: Not Currently     Alcohol/week: 1.0 standard drink of alcohol     Types: 1 Cans of beer per week    Drug use: Never    Sexual activity: Yes     Partners: Female     Birth control/protection: None   [5]   Family History  Problem Relation Name Age of Onset    Dementia Mother      Hypertension Mother      Hypertension Father      Psoriasis Mother Elizabeth Johno    [6]   Past Surgical History:  Procedure Laterality Date    APPENDECTOMY  1/1967    CIRCUMCISION, PRIMARY  11/1962    COLON SURGERY  1/1984    OTHER SURGICAL HISTORY  11/15/2013    Abdominal Surgery

## 2025-07-11 ENCOUNTER — APPOINTMENT (OUTPATIENT)
Dept: PODIATRY | Facility: CLINIC | Age: 65
End: 2025-07-11
Payer: COMMERCIAL

## 2025-07-11 DIAGNOSIS — M72.2 PLANTAR FASCIITIS: Primary | ICD-10-CM

## 2025-07-11 PROCEDURE — 99212 OFFICE O/P EST SF 10 MIN: CPT | Performed by: PODIATRIST

## 2025-07-11 PROCEDURE — 1036F TOBACCO NON-USER: CPT | Performed by: PODIATRIST

## 2025-07-11 NOTE — PROGRESS NOTES
CC: right heel pain.      HPI:  Patient presents follow up heel injection right foot for plantar fasciitis, pain went from 8/10 to 4/10, to doing the hep and price at home..     PCP: Dr. Ro  Last visit: 1-24-25      PMH  [Medical History]    [Medical History]  Past Medical History       Diagnosis Date    Bunion 1/1/23    Lyssa wynn 01-    GERD (gastroesophageal reflux disease) 1/2015    Gout      Neck strain      Personal history of other specified conditions 07/21/2019     History of fatigue    Plantar fasciitis 4-1-25    Rotator cuff syndrome      Varicella 1/1967     MEDS  [Current Medications]    [Current Medications]     Current Outpatient Medications:     fish oil concentrate (Fish OiL) 120-180 mg capsule, Take 1 capsule (1 g) by mouth once daily., Disp: , Rfl:     magnesium 250 mg tablet, Take 1 tablet (250 mg) by mouth once daily., Disp: , Rfl:     multivitamin tablet, Take 1 tablet by mouth once daily., Disp: , Rfl:     zinc sulfate (ZINC-220 ORAL), Take 1 tablet by mouth once daily., Disp: , Rfl:   Allergies  [Allergies]    [Allergies]  No Known Allergies  [Social History]    [Social History]        Socioeconomic History    Marital status:    Tobacco Use    Smoking status: Never    Smokeless tobacco: Never   Vaping Use    Vaping status: Never Used   Substance and Sexual Activity    Alcohol use: Not Currently       Alcohol/week: 1.0 standard drink of alcohol       Types: 1 Cans of beer per week    Drug use: Never    Sexual activity: Yes       Partners: Female       Birth control/protection: None     [Family History]    [Family History]         Problem Relation Name Age of Onset    Dementia Mother        Hypertension Mother        Hypertension Father        Psoriasis Mother Elizabeth Batoolricha       [Surgical History]    [Surgical History]  Past Surgical History        Procedure Laterality Date    APPENDECTOMY   1/1967    CIRCUMCISION, PRIMARY   11/1962    COLON SURGERY   1/1984    OTHER  SURGICAL HISTORY   11/15/2013     Abdominal Surgery        REVIEW OF SYSTEMS     Musculoskeletal: + as noted in HPI.         Physical examination:   On General Observation: Patient is a pleasant, cooperative, well developed 64 y.o.  adult male. The patient is alert and oriented to time, place and person. Patient has normal affect and mood.  There were no vitals taken for this visit.     Vascular:  DP and PT pulses are 2/4 b/l.  Mild edema to noted to right heel.  CFT 5 sec 1-5.     Muscular: Strength is 5/5 for all instrinsic and extrinsic muscle groups with exception of ankle dorsiflexion and plantarflexion d/t guarding.  Pain with palpation to the plantar medial aspect of the right heel at the insertion of the plantar medial calcaneal tubercle.  No signs of calcaneal stress fracture.       Neuro:   Light touch present bilateral.      Derm:   Skin texture and turgor within normal limits.     No rashes, subcutaneous nodules, or open lesions noted.  No hyperkeratotic tissue. No erythema     Ortho:  ROM is stable 1st mpj, mtj, stj, aj, decreased pain is present medial band of the plantar fascia, loss of the medial arch present, heel inversion present with toe raise.     Xrays:  STUDY:  XR CALCANEUS RIGHT 2 VIEWS; ;  3/16/2025 5:55 pm      INDICATION:  Signs/Symptoms:pain at heel for 2 weeks.      COMPARISON:  None.      ACCESSION NUMBER(S):  [id number]      ORDERING CLINICIAN:  VIJAY ANDERSON      FINDINGS:  Single-view lateral radiographs of the ankle/calcaneus was obtained.  No additional images could be obtained due to equipment malfunction.      No fractures or destructive lesions are identified. The joint spaces  and articular surfaces are maintained. The alignment is anatomic. The  soft tissues are unremarkable.      IMPRESSION:  Normal limited lateral view of the right ankle/calcaneus.      ASSESSMENT:    Plantar fasciitis right   Pes planus  Pain right foot         PLAN:   Exam  Reviewed with pt, much  improved since the injection, recommend to continue the hep and price, will check benefits for orthotics, follow up with these.

## 2025-07-17 ENCOUNTER — TELEPHONE (OUTPATIENT)
Dept: PRIMARY CARE | Facility: CLINIC | Age: 65
End: 2025-07-17
Payer: COMMERCIAL

## 2025-07-17 NOTE — TELEPHONE ENCOUNTER
7/14/25  [facility] [place]   [phone]  PER: AVR  PATIENTS INSURANCE BEGAN ON 9/1/2008 AND TERMED ON 2/28/25.  PATIENT GAVE US INSURANCE CARD ON 7/11/25 VISIT.  REFERENCE #[id number]    LMOM FOR PATIENT, ASKING FOR CORRECT INSURANCE INFO. THANK YOU!

## 2025-07-17 NOTE — TELEPHONE ENCOUNTER
7/17/25  CALLED PATIENT AND SPOKE WITH HIM. TOLD HIM THAT [employer] INSURANCE TERMED ON 2/25/25. HE SAID HE NOW HAS [employer] INSURANCE. TOLD HIM THAT HE WOULD HAVE TO PROVIDE ME WITH CORRECT INSURANCE INFO, BEFORE I COULD CHECK INSURANCE BENEFITS. SAID THAT HE WOULD CALL HIS INSURANCE AND FIND OUT WHAT IS GOING ON AND CALL US WITH CORRECT INFO. UNTIL THEN, PATIENT HAS NO INSURANCE COVERAGE AND IS SELF PAY. THANK YOU!

## 2025-07-17 NOTE — TELEPHONE ENCOUNTER
----- Message from Joaquin Sutton sent at 7/11/2025 10:51 AM EDT -----  Regarding: orthotics  Please check orthotics dx is m72.2 thanks

## 2025-07-23 NOTE — TELEPHONE ENCOUNTER
7/22/25  INSURANCE UPDATED TO REFLECT CORRECT INSURANCE.  MESSAGE SENT TO  TO SUBMIT 7/11/25 VISIT TO [facility] [place]. THANK YOU!

## 2025-07-23 NOTE — TELEPHONE ENCOUNTER
7/23/25  [employer] PPO [employer] PLAN  [phone]  PER: JOHN JJ  EFFECTIVE 7/1/25  CPT  X2  DX M72.2 ARE VALID AND BILLABLE, BASED ON MEDICAL NECESSITY  PLAN PAYS AT 80% OF THE ALLOWED AMT, ONCE $400 INDIVIDUAL DEDUCTIBLE HAS BEEN MET. SO FAR 0 HAS BEEN ACCUMULATED. $3500 INDIVIDUAL COINSURANCE OF WHICH 0 HAS BEEN ACCUMULATED AND $3975.00 INDIVIDUAL OUT OF POCKET OF WHICH 0 HAS BEEN ACCUMULATED.  PLAN ALLOWS 1 PAIR EVERY 547 DAYS (1 1/2 YEARS)  NO PRIOR AUTHORIZATION IS REQUIRED.  REFERENCE #[id number]    SPOKE WITH PATIENT AND GAVE HIM THE ABOVE INFO. PATIENT SCHEDULED ON 7/25/25 @ 1:30 PM. THANK YOU!

## 2025-07-25 ENCOUNTER — OFFICE VISIT (OUTPATIENT)
Dept: PODIATRY | Facility: CLINIC | Age: 65
End: 2025-07-25
Payer: COMMERCIAL

## 2025-07-25 VITALS — HEIGHT: 68 IN | WEIGHT: 197.4 LBS | BODY MASS INDEX: 29.92 KG/M2

## 2025-07-25 DIAGNOSIS — M79.671 RIGHT FOOT PAIN: ICD-10-CM

## 2025-07-25 DIAGNOSIS — M72.2 PLANTAR FASCIITIS: Primary | ICD-10-CM

## 2025-07-25 RX ORDER — TRIAMCINOLONE ACETONIDE 40 MG/ML
20 INJECTION, SUSPENSION INTRA-ARTICULAR; INTRAMUSCULAR
Status: COMPLETED | OUTPATIENT
Start: 2025-07-25 | End: 2025-07-25

## 2025-07-25 RX ADMIN — TRIAMCINOLONE ACETONIDE 20 MG: 40 INJECTION, SUSPENSION INTRA-ARTICULAR; INTRAMUSCULAR at 13:50

## 2025-07-25 NOTE — PROGRESS NOTES
CC: right heel pain.      HPI:  Patient presents follow up heel injection right foot for plantar fasciitis, pain went from 8/10 to 4/10, to doing the hep and price, still has pain, requesting 2nd injection, also here to be casted for orthotics.     PCP: Dr. Ro  Last visit: 1-24-25      PMH  [Medical History]    [Medical History]  Past Medical History          Diagnosis Date    Bunion 1/1/23    Lyssa wynn 01-    GERD (gastroesophageal reflux disease) 1/2015    Gout      Neck strain      Personal history of other specified conditions 07/21/2019     History of fatigue    Plantar fasciitis 4-1-25    Rotator cuff syndrome      Varicella 1/1967      MEDS  [Current Medications]    [Current Medications]     Current Outpatient Medications:     fish oil concentrate (Fish OiL) 120-180 mg capsule, Take 1 capsule (1 g) by mouth once daily., Disp: , Rfl:     magnesium 250 mg tablet, Take 1 tablet (250 mg) by mouth once daily., Disp: , Rfl:     multivitamin tablet, Take 1 tablet by mouth once daily., Disp: , Rfl:     zinc sulfate (ZINC-220 ORAL), Take 1 tablet by mouth once daily., Disp: , Rfl:   Allergies  [Allergies]    [Allergies]  No Known Allergies  [Social History]    [Social History]            Socioeconomic History    Marital status:    Tobacco Use    Smoking status: Never    Smokeless tobacco: Never   Vaping Use    Vaping status: Never Used   Substance and Sexual Activity    Alcohol use: Not Currently       Alcohol/week: 1.0 standard drink of alcohol       Types: 1 Cans of beer per week    Drug use: Never    Sexual activity: Yes       Partners: Female       Birth control/protection: None      [Family History]    [Family History]              Problem Relation Name Age of Onset    Dementia Mother        Hypertension Mother        Hypertension Father        Psoriasis Mother Elizabeth Batoolfabiano        [Surgical History]    [Surgical History]  Past Surgical History            Procedure Laterality Date     APPENDECTOMY   1/1967    CIRCUMCISION, PRIMARY   11/1962    COLON SURGERY   1/1984    OTHER SURGICAL HISTORY   11/15/2013     Abdominal Surgery         REVIEW OF SYSTEMS     Musculoskeletal: + as noted in HPI.         Physical examination:   On General Observation: Patient is a pleasant, cooperative, well developed 64 y.o.  adult male. The patient is alert and oriented to time, place and person. Patient has normal affect and mood.  There were no vitals taken for this visit.     Vascular:  DP and PT pulses are 2/4 b/l.  Mild edema to noted to right heel.  CFT 5 sec 1-5.     Muscular: Strength is 5/5 for all instrinsic and extrinsic muscle groups with exception of ankle dorsiflexion and plantarflexion d/t guarding.  Pain with palpation to the plantar medial aspect of the right heel at the insertion of the plantar medial calcaneal tubercle.  No signs of calcaneal stress fracture.       Neuro:   Light touch present bilateral.      Derm:   Skin texture and turgor within normal limits.     No rashes, subcutaneous nodules, or open lesions noted.  No hyperkeratotic tissue. No erythema     Ortho:  ROM is stable 1st mpj, mtj, stj, aj, decreased pain is present medial band of the plantar fascia, loss of the medial arch present, heel inversion present with toe raise.     Xrays:  STUDY:  XR CALCANEUS RIGHT 2 VIEWS; ;  3/16/2025 5:55 pm      INDICATION:  Signs/Symptoms:pain at heel for 2 weeks.      COMPARISON:  None.      ACCESSION NUMBER(S):  [id number]      ORDERING CLINICIAN:  VIJAY ANDERSON      FINDINGS:  Single-view lateral radiographs of the ankle/calcaneus was obtained.  No additional images could be obtained due to equipment malfunction.      No fractures or destructive lesions are identified. The joint spaces  and articular surfaces are maintained. The alignment is anatomic. The  soft tissues are unremarkable.      IMPRESSION:  Normal limited lateral view of the right ankle/calcaneus.      ASSESSMENT:    Plantar  "fasciitis right   Pes planus  Pain right foot         PLAN:   Exam  Reviewed with pt, will still continue the hep and price, injection number 2 done today right heel, casted for custom orthotics biofoam sport, will follow up when arrives.    Patient ID: Abrahan Richardson \"Stephanie" is a 64 y.o. male.    S Inj/Asp (Right foot) on 7/25/2025 1:50 PM  Indications: pain  Details: 25 G needle (Plantar medial) approach  Medications: 2 mg dexAMETHasone 4 mg/mL; 20 mg triamcinolone acetonide 40 mg/mL    Mixed with 50/50 2% lidocaine plain and 0.5% marcaine plain  Procedure, treatment alternatives, risks and benefits explained, specific risks discussed. Consent was given by the patient. Immediately prior to procedure a time out was called to verify the correct patient, procedure, equipment, support staff and site/side marked as required. Patient was prepped and draped in the usual sterile fashion.         "

## 2025-09-02 ENCOUNTER — TELEPHONE (OUTPATIENT)
Dept: PRIMARY CARE | Facility: CLINIC | Age: 65
End: 2025-09-02
Payer: COMMERCIAL

## 2025-09-16 ENCOUNTER — APPOINTMENT (OUTPATIENT)
Dept: PODIATRY | Facility: CLINIC | Age: 65
End: 2025-09-16
Payer: COMMERCIAL

## 2026-01-14 ENCOUNTER — APPOINTMENT (OUTPATIENT)
Dept: PRIMARY CARE | Facility: CLINIC | Age: 66
End: 2026-01-14
Payer: COMMERCIAL